# Patient Record
Sex: FEMALE | Race: BLACK OR AFRICAN AMERICAN | NOT HISPANIC OR LATINO | Employment: UNEMPLOYED | URBAN - METROPOLITAN AREA
[De-identification: names, ages, dates, MRNs, and addresses within clinical notes are randomized per-mention and may not be internally consistent; named-entity substitution may affect disease eponyms.]

---

## 2023-04-04 LAB
ERYTHROCYTE DISTRIBUTION WIDTH (RATIO) BY AUTOMATED COUNT: 14.2 % (ref 11.5–14.5)
ERYTHROCYTE MEAN CORPUSCULAR HEMOGLOBIN CONCENTRATION (G/DL) BY AUTOMATED: 32.2 G/DL (ref 32–36)
ERYTHROCYTE MEAN CORPUSCULAR VOLUME (FL) BY AUTOMATED COUNT: 80 FL (ref 80–100)
ERYTHROCYTES (10*6/UL) IN BLOOD BY AUTOMATED COUNT: 3.65 X10E12/L (ref 4–5.2)
FERRITIN, PREGNANCY: 16 UG/L
FOLATE, SERUM, PREGNANCY: 20 NG/ML
GLUCOSE, 1 HR SCREEN, PREG: 140 MG/DL
HEMATOCRIT (%) IN BLOOD BY AUTOMATED COUNT: 29.2 % (ref 36–46)
HEMOGLOBIN (G/DL) IN BLOOD: 9.4 G/DL (ref 12–16)
IRON (UG/DL) IN SER/PLAS IN PREGNANCY: 55 UG/DL
IRON BINDING CAPACITY (UG/DL) IN PREGNANCY: >505 UG/DL
IRON SATURATION (%) IN PREGNANCY: ABNORMAL %
LEUKOCYTES (10*3/UL) IN BLOOD BY AUTOMATED COUNT: 5.4 X10E9/L (ref 4.4–11.3)
PLATELETS (10*3/UL) IN BLOOD AUTOMATED COUNT: 302 X10E9/L (ref 150–450)
REFLEX ADDED, ANEMIA PANEL: ABNORMAL
VITAMIN B12, PREGNANCY: 310 PG/ML

## 2023-04-05 LAB — SYPHILIS TOTAL AB: NONREACTIVE

## 2023-04-11 LAB
GLUCOSE THREE HOUR: 124 MG/DL
GLUCOSE TWO HOUR: 141 MG/DL
GLUCOSE, FASTING: 76 MG/DL
GLUCOSE, ONE HOUR: 132 MG/DL
GTTCM: NORMAL

## 2023-04-14 ENCOUNTER — HOSPITAL ENCOUNTER (OUTPATIENT)
Dept: DATA CONVERSION | Facility: HOSPITAL | Age: 33
End: 2023-04-14
Attending: OBSTETRICS & GYNECOLOGY
Payer: COMMERCIAL

## 2023-04-14 DIAGNOSIS — O47.03 FALSE LABOR BEFORE 37 COMPLETED WEEKS OF GESTATION, THIRD TRIMESTER (HHS-HCC): ICD-10-CM

## 2023-04-14 DIAGNOSIS — Z3A.27 27 WEEKS GESTATION OF PREGNANCY (HHS-HCC): ICD-10-CM

## 2023-04-14 DIAGNOSIS — O09.12: ICD-10-CM

## 2023-04-14 DIAGNOSIS — M54.50 LOW BACK PAIN, UNSPECIFIED: ICD-10-CM

## 2023-04-14 DIAGNOSIS — Z87.59 PERSONAL HISTORY OF OTHER COMPLICATIONS OF PREGNANCY, CHILDBIRTH AND THE PUERPERIUM: ICD-10-CM

## 2023-04-14 DIAGNOSIS — O26.892 OTHER SPECIFIED PREGNANCY RELATED CONDITIONS, SECOND TRIMESTER (HHS-HCC): ICD-10-CM

## 2023-04-14 DIAGNOSIS — N89.8 OTHER SPECIFIED NONINFLAMMATORY DISORDERS OF VAGINA: ICD-10-CM

## 2023-04-14 DIAGNOSIS — O99.012 ANEMIA COMPLICATING PREGNANCY, SECOND TRIMESTER (HHS-HCC): ICD-10-CM

## 2023-04-14 DIAGNOSIS — B00.9 HERPESVIRAL INFECTION, UNSPECIFIED: ICD-10-CM

## 2023-04-14 DIAGNOSIS — O40.2XX0: ICD-10-CM

## 2023-04-14 DIAGNOSIS — D56.1 BETA THALASSEMIA (MULTI): ICD-10-CM

## 2023-04-14 DIAGNOSIS — O98.512 OTHER VIRAL DISEASES COMPLICATING PREGNANCY, SECOND TRIMESTER (HHS-HCC): ICD-10-CM

## 2023-04-14 LAB
CLUE CELLS WET PREP: NORMAL
TRICH WET PREP: NORMAL
WBC WET PREP: NORMAL /HPF
YEAST PRESENCE WET PREP: NORMAL

## 2023-05-24 LAB
ALANINE AMINOTRANSFERASE (SGPT) (U/L) IN SER/PLAS: 12 U/L (ref 7–45)
ALBUMIN (G/DL) IN SER/PLAS: 3.6 G/DL (ref 3.4–5)
ALKALINE PHOSPHATASE (U/L) IN SER/PLAS: 84 U/L (ref 33–110)
ANION GAP IN SER/PLAS: 13 MMOL/L (ref 10–20)
ASPARTATE AMINOTRANSFERASE (SGOT) (U/L) IN SER/PLAS: 15 U/L (ref 9–39)
BILIRUBIN TOTAL (MG/DL) IN SER/PLAS: 0.3 MG/DL (ref 0–1.2)
CALCIUM (MG/DL) IN SER/PLAS: 8.8 MG/DL (ref 8.6–10.3)
CARBON DIOXIDE, TOTAL (MMOL/L) IN SER/PLAS: 21 MMOL/L (ref 21–32)
CHLORIDE (MMOL/L) IN SER/PLAS: 104 MMOL/L (ref 98–107)
CREATININE (MG/DL) IN SER/PLAS: 0.56 MG/DL (ref 0.5–1.05)
GFR FEMALE: >90 ML/MIN/1.73M2
GLUCOSE (MG/DL) IN SER/PLAS: 114 MG/DL (ref 74–99)
POTASSIUM (MMOL/L) IN SER/PLAS: 3.8 MMOL/L (ref 3.5–5.3)
PROTEIN TOTAL: 6.6 G/DL (ref 6.4–8.2)
SODIUM (MMOL/L) IN SER/PLAS: 134 MMOL/L (ref 136–145)
UREA NITROGEN (MG/DL) IN SER/PLAS: 8 MG/DL (ref 6–23)

## 2023-06-09 ENCOUNTER — HOSPITAL ENCOUNTER (OUTPATIENT)
Dept: DATA CONVERSION | Facility: HOSPITAL | Age: 33
End: 2023-06-09
Attending: OBSTETRICS & GYNECOLOGY
Payer: COMMERCIAL

## 2023-06-09 DIAGNOSIS — O09.213 SUPERVISION OF PREGNANCY WITH HISTORY OF PRE-TERM LABOR, THIRD TRIMESTER (HHS-HCC): ICD-10-CM

## 2023-06-09 DIAGNOSIS — O26.893 OTHER SPECIFIED PREGNANCY RELATED CONDITIONS, THIRD TRIMESTER (HHS-HCC): ICD-10-CM

## 2023-06-09 DIAGNOSIS — A60.00 HERPESVIRAL INFECTION OF UROGENITAL SYSTEM, UNSPECIFIED: ICD-10-CM

## 2023-06-09 DIAGNOSIS — D56.1 BETA THALASSEMIA (MULTI): ICD-10-CM

## 2023-06-09 DIAGNOSIS — Z79.899 OTHER LONG TERM (CURRENT) DRUG THERAPY: ICD-10-CM

## 2023-06-09 DIAGNOSIS — O98.313: ICD-10-CM

## 2023-06-09 DIAGNOSIS — O99.013 ANEMIA COMPLICATING PREGNANCY, THIRD TRIMESTER (HHS-HCC): ICD-10-CM

## 2023-06-09 DIAGNOSIS — N89.8 OTHER SPECIFIED NONINFLAMMATORY DISORDERS OF VAGINA: ICD-10-CM

## 2023-06-09 DIAGNOSIS — Z3A.35 35 WEEKS GESTATION OF PREGNANCY (HHS-HCC): ICD-10-CM

## 2023-06-09 DIAGNOSIS — O09.13 SUPERVISION OF PREGNANCY WITH HISTORY OF ECTOPIC PREGNANCY, THIRD TRIMESTER (HHS-HCC): ICD-10-CM

## 2023-06-09 LAB
CHLAMYDIA TRACH., AMPLIFIED: NEGATIVE
N. GONORRHEA, AMPLIFIED: NEGATIVE
POCT GLUCOSE: 72 MG/DL (ref 74–99)

## 2023-06-11 ENCOUNTER — HOSPITAL ENCOUNTER (OUTPATIENT)
Dept: DATA CONVERSION | Facility: HOSPITAL | Age: 33
End: 2023-06-11
Attending: OBSTETRICS & GYNECOLOGY
Payer: COMMERCIAL

## 2023-06-11 DIAGNOSIS — Z87.59 PERSONAL HISTORY OF OTHER COMPLICATIONS OF PREGNANCY, CHILDBIRTH AND THE PUERPERIUM: ICD-10-CM

## 2023-06-11 DIAGNOSIS — O99.013 ANEMIA COMPLICATING PREGNANCY, THIRD TRIMESTER (HHS-HCC): ICD-10-CM

## 2023-06-11 DIAGNOSIS — A60.00 HERPESVIRAL INFECTION OF UROGENITAL SYSTEM, UNSPECIFIED: ICD-10-CM

## 2023-06-11 DIAGNOSIS — D56.1 BETA THALASSEMIA (MULTI): ICD-10-CM

## 2023-06-11 DIAGNOSIS — Z34.80 ENCOUNTER FOR SUPERVISION OF OTHER NORMAL PREGNANCY, UNSPECIFIED TRIMESTER (HHS-HCC): ICD-10-CM

## 2023-06-11 DIAGNOSIS — O98.313: ICD-10-CM

## 2023-06-11 DIAGNOSIS — Z3A.35 35 WEEKS GESTATION OF PREGNANCY (HHS-HCC): ICD-10-CM

## 2023-06-11 DIAGNOSIS — O36.5930 MATERNAL CARE FOR OTHER KNOWN OR SUSPECTED POOR FETAL GROWTH, THIRD TRIMESTER, NOT APPLICABLE OR UNSPECIFIED (HHS-HCC): ICD-10-CM

## 2023-06-11 DIAGNOSIS — O09.213 SUPERVISION OF PREGNANCY WITH HISTORY OF PRE-TERM LABOR, THIRD TRIMESTER (HHS-HCC): ICD-10-CM

## 2023-06-11 DIAGNOSIS — O09.13 SUPERVISION OF PREGNANCY WITH HISTORY OF ECTOPIC PREGNANCY, THIRD TRIMESTER (HHS-HCC): ICD-10-CM

## 2023-06-11 DIAGNOSIS — Z79.899 OTHER LONG TERM (CURRENT) DRUG THERAPY: ICD-10-CM

## 2023-06-11 DIAGNOSIS — Z29.89 ENCOUNTER FOR OTHER SPECIFIED PROPHYLACTIC MEASURES: ICD-10-CM

## 2023-06-11 LAB — GROUP B STREP SCREEN: ABNORMAL

## 2023-06-12 ENCOUNTER — DOCUMENTATION (OUTPATIENT)
Dept: CARE COORDINATION | Facility: CLINIC | Age: 33
End: 2023-06-12
Payer: COMMERCIAL

## 2023-06-14 ENCOUNTER — PATIENT OUTREACH (OUTPATIENT)
Dept: CARE COORDINATION | Facility: CLINIC | Age: 33
End: 2023-06-14
Payer: COMMERCIAL

## 2023-06-27 ENCOUNTER — DOCUMENTATION (OUTPATIENT)
Dept: CARE COORDINATION | Facility: CLINIC | Age: 33
End: 2023-06-27
Payer: COMMERCIAL

## 2023-09-07 VITALS — HEIGHT: 67 IN | WEIGHT: 167.11 LBS | BODY MASS INDEX: 26.23 KG/M2

## 2023-09-07 VITALS — HEIGHT: 67 IN | BODY MASS INDEX: 26.26 KG/M2 | HEIGHT: 67 IN | WEIGHT: 167.33 LBS | BODY MASS INDEX: 26.34 KG/M2

## 2023-09-14 NOTE — PROGRESS NOTES
Current Stage:   Stage: Triage     Subjective Data:   Antepartum:  Vaginal Bleeding: No   Contractions/Abdominal Pain: Yes   Discharge/Loss of Fluid: Yes   Fetal Movement: Good   Fevers/Chills: No   Preeclampsia Symptoms: No   Antepartum:    32yo  @ 27.5 wga by LMP c/w 12.2 US presents for c/f leaking fluid and lower back pain. She reports at 1030AM she noticed her shorts felt damp. She was out running errands  and continued to notice that feeling. She reports no change in discharge. She reports feeling increased jessica-jacobs ctx over the last 2 days. She endorses good FM and no VB.     Pregnancy c/b:  - polyhydraminos, JAVIER 25  - abnl 1 hr, nl 3 hr  - h/o PEC in prior pregnancy  - Beta thalassemia, last Hgb 9.9  - HSV  - h/o endometritis in prior pregnancy and concern for septic pelvic thrombophlebitis    ObHx:  ectopic w/ salpingectomy after failed MTX    @39w, c/b PEC   2019  @37 wga SROM    GynHx:   PMHx: Beta Thalassemia, anemia, migraines with aura  PSHx: R salpingectomy, tonsillectomy,   FHx: DM, HTN  SHx: denies, lives with partner and son  Meds: PNV,  tylenol, zyrtec, flonase, Iron  Allergies: Bactrim, Levaquin, reglan, sulfa drugs            Objective Information:    Objective Information:      T   P  R  BP   MAP  SpO2   Value  36.4  93  17  131/77      98%  Date/Time  12:36  13:59  12:36  12:36     13:59  Range  (36.4C - 36.4C )  (91 - 107 )  (17 - 17 )  (131 - 131 )/ (77 - 77 )    (96% - 100% )      Pain reported at  12:36: 0 = None      Physical Exam:   Constitutional: alert, oriented   Obstetric: FHT: 135, mod variability, +accels, -decels   TOCO: irreg ctx w/ uterine irritability   SSE: neg for pooling, nitrazine, ferning, thin white discharge in vaginal vault    SVE: 0/0/-3, unchanged on 2 hr recheck   Respiratory/Thorax: normal respiratory effort, on  room air   Gastrointestinal: +BS, +flatus   Musculoskeletal: CORRALES   Extremities: no  erythema, edema, or tenderness to  palpation of b/l calves   Neurological: no deficits   Psychological: appropriate affect   Skin: no rashes or lesions      Testing:   NST Interpretation - Baby A:  ·  Baseline    ·  Variability moderate (amplitude range 6 to 25 bpm)   ·  Interpretation Appropriate for EGA (2 10x10 accels)   ·  Accelerations +   ·  Decelerations -     Assessment and Plan:   Assessment:    30yo  @ 27.4 wga by LMP c/w 12.2 US presents for c/f leaking fluid and lower back pain.     False Labor  - R/o SROM- neg x3 for pooling, nitrazine, ferning    - Cervix: 0/0/-3, unchanged on 2 hr recheck  - Some irregular ctx and uterine irritability noted on toco  - Wet prep pending, will call w/ abnl results  - Uterine irritability likely d/t dehydration vs BV  - Patient is not in PTL, reviewed return precautions and s/sx of labor   - Recommended tylenol, warm showers, hot packs for discomfort    IUP at 27.4  - NST AGA  - Good fetal movement  - BSUS w/ Dr. Randall- JAVIER 22, MVP 8.3  - Continue routine prenatal care, next appt: next wed   - Precautions to return discussed     Maternal Well-being  - Vital signs stable and WNL  - Emotional support and reassurance provided  - All questions and concerns addressed      D/w Dr. Maza.  DEDRICK Bryant           Plan of Care Reviewed With:  Plan of Care Reviewed With: patient     Attestation:   Note Completion:  I am a:  Advanced Practice Provider   Attending Only - Shared Visit with Advanced Practice Provider This is a shared visit.  I have reviewed the Advanced Practice Provider?s encounter note, approve the Advanced Practice Provider?s documentation,  and provide the following additional information from my personal encounter.    Comments/ Additional Findings    27 weeks  No evidence of rupture on exam, poly on JAVIER.  Return precautions discussed  Can Maza MD          Electronic Signatures:  Can Maza)  (Signed 2023  07:42)   Authored: Note Completion   Co-Signer: Current Stage, Subjective Data, Objective Data,  Testing, Assessment and Plan, Note Completion  Jazmine Castillo (PAC)  (Signed 2023 18:43)   Authored: Current Stage, Subjective Data, Objective Data,   Testing, Assessment and Plan, Note Completion      Last Updated: 2023 07:42 by Can Maza)

## 2023-09-30 NOTE — PROGRESS NOTES
Current Stage:   Stage: Triage     Subjective Data:   Antepartum:  Antepartum:      32yo  @ 35.6 wga by LMP c/w 12.2 US presents for 2nd dose of BMZ after recent admission for PTL.     She was admitted yesterday for ctx, found to be 4-5cm and was admitted for obs. SVE on d/c was 4.5/70-2. She states she is continuing to have mild ctx q5min. Denies LOF, VB, or dec FM.    Pregnancy c/b:  - h/o  delivery at 35.6 c/b PEC  - Intermittent polyhydraminos, JAVIER 27 on   - FGR, last US , EFW 5% (1916 g), AC <1%  - abnl 1 hr, nl 3 hr  - Beta thalassemia, last Hgb 10.3 (), scheduled for iron infusion   - genital HSV- taking ppx valacyclovir  - h/o endometritis  and concern for septic pelvic thrombophlebitis  - One mild range BP on  at office    ObHx:   2007 ectopic w/ R salpingectomy after failed MTX    @35.6w, c/b PEC   2019  @37 wga SROM  2 TABs    GynHx:  co-testing NIL, negative  PMHx: Beta Thalassemia, anemia, migraines with aura  PSHx: R salpingectomy, tonsillectomy  FHx: DM, HTN  SHx: denies t/e/d  Meds: PNV,  vitamin D, Valacyclovir daily  Allergies: Bactrim, Levaquin, reglan, sulfa drugs          Objective Information:    Objective Information:      T   P  R  BP   MAP  SpO2   Value  36.7  100  17  120/75   91  97%  Date/Time  21:43  21:43  21:43  21:43   21:43  21:43  Range  (36.5C - 36.7C )  (94 - 118 )  (17 - 18 )  (120 - 129 )/ (75 - 81 )  (91 - 99 )  (89% - 100% )      Pain reported at  21:30: 0 = None      Physical Exam:   Constitutional: Alert, well-appearing   Obstetric: SVE: 4.5/70/-2, unchanged from prior  FHT: baseline 130s, mod variability, + accels, - decels  TOCO: occasional rare ctx   Eyes: EOMI   Respiratory/Thorax: breathing comfortably on room  air   Cardiovascular: WWP   Musculoskeletal: ROM nl   Neurological: No focal deficits   Psychological: Appropriate affect      Testing:   NST Interpretation -  Baby A:  ·  Baseline    ·  Variability moderate (amplitude range 6 to 25 bpm)   ·  Interpretation Reactive (2 15x15 accels)   ·  Accelerations +   ·  Decelerations -     Assessment and Plan:   Assessment:    32yo  @ 35.6 wga by LMP c/w 12.2 US presents for 2nd dose of BMZ after recent admission for PTL, continuing to have ctx.    r/o PTL  - Patient continuing to have subjective ctx however rare ctx visualized on toco and patient very comfortable.   - cervix stable at 4.5cm over multiple checks, low concern for progressing PTL.  - offered patient 2hr recheck to confirm stability, however patient declines.   - 2nd dose of BMZ given  - Patient discharged with labor precautions. Instructed to return to care if contractions occur <5 minutes apart, new onset of vaginal bleeding, loss of fluid, decreased fetal movement  or any other symptoms.    Fetal wellbeing  - NST reactive  - BMZ complete 6/10-    Dispo: home with follow up scheduled  with Dr. Hills    D/w Dr. Elisa Chau MD, PGY-1        Attestation:   Note Completion:  I am a:  Resident/Fellow   Attending Attestation I saw and evaluated the patient.  I personally obtained the key and critical portions of the history and physical exam or was physically present for key and  critical portions performed by the resident/fellow. I reviewed the resident/fellow?s documentation and discussed the patient with the resident/fellow.  I agree with the resident/fellow?s medical decision making as documented in the note.     I personally evaluated the patient on 2023         Electronic Signatures:  Camelia Chau (Resident))  (Signed 2023 00:14)   Authored: Current Stage, Subjective Data, Objective Data,   Testing, Assessment and Plan, Note Completion  Casandra Pérez)  (Signed 2023 21:35)   Authored: Note Completion   Co-Signer: Current Stage, Subjective Data, Objective Data,  Testing, Assessment and  Plan, Note Completion      Last Updated: 25-Jun-2023 21:35 by Casandra Pérez)

## 2023-09-30 NOTE — PROGRESS NOTES
Current Stage:   Stage: Triage     Subjective Data:   Antepartum:  Antepartum:    32yo  @ 35.4 wga by LMP c/w 12.2 US presents for contractions. Contractions started yesterday morning prior to prental appt. Increase in intensity ~2300. Patient notes  brown mucous this morning, denies bleeding and LOF, endorses good FM. Patient reports decrease in intensity and frequency of ctx this afternoon, now irregular and rated 6-8/10 in intensity. Denies vaginal signs and symptoms of infection.    Pregnancy c/b:  - h/o  delivery at 35.6 c/b PEC  - Intermittent polyhydraminos, JAVIER 27 on   - FGR, last US , EFW 5% (1916 g), AC <1%  - abnl 1 hr, nl 3 hr  - Beta thalassemia, last Hgb 10.3 (), scheduled for iron infusion   - genital HSV- taking ppx valacyclovir  - h/o endometritis  and concern for septic pelvic thrombophlebitis    ObHx:    ectopic w/ R salpingectomy after failed MTX    @35.6w, c/b PEC   2019  @37 wga SROM  2 TABs    GynHx:  co-testing NIL, negative  PMHx: Beta Thalassemia, anemia, migraines with aura  PSHx: R salpingectomy, tonsillectomy  FHx: DM, HTN  SHx: denies t/e/d  Meds: PNV,  vitamin D, Valacyclovir  Allergies: Bactrim, Levaquin, reglan, sulfa drugs      Objective Information:    Objective Information:      T   P  R  BP   MAP  SpO2   Value     85     121/74   93     Date/Time    16:17    16:17   16:17    Range     (83 - 98 )    (117 - 130 )/ (74 - 87 )  (91 - 104 )        Physical Exam:   Constitutional: well-appearing   Obstetric: FHT: 135, mod variability, +accels, -decels   [place]: irregular ctx, 5-10 min  SVE: 3/50/-2   Respiratory/Thorax: normal respiratory effort   Cardiovascular: Warm, well-perfused   Gastrointestinal: Passing flatus, tolerating PO diet   Genitourinary: Voiding without difficulty   Musculoskeletal: CORRALES   Extremities: No tenderness to palpation, asymmetrical  swelling, or erythema of bilateral calves    Neurological: A&Ox3   Psychological: affect appropriate to clinical situation   Skin: Grossly intact      Testing:   NST Interpretation - Baby A:  ·  Baseline    ·  Variability moderate (amplitude range 6 to 25 bpm)   ·  Interpretation Reactive (2 15x15 accels)   ·  Accelerations +   ·  Decelerations -     Assessment and Plan:   Comorbidity:  ·  Comorbidty anemia   ·  Anemia iron deficiency anemia     Assessment:    30yo  @ 35.4 wga by LMP c/w 12.2 US presents for contractions.      contractions  - SVE 3/50/-2, unchanged from check at prenatal visit yesterday  - Contractions irregular, 5-10 min while in triage  - 3+ ketones on urine chemistry, POC 72, checked d/t polyhydramnios, PO hydration and food  - Flexeril and tylenol decreased intensity and frequency of contractions and back pain  - Patient declines rx for flexeril for home  - Recommend increasing PO fluids and small frequent snacks    IUP at 35.4 wga  - NST reactive  - Good fetal movement  - Continue routine prenatal care  - Next appt 6/15    Maternal Well-being  - Vital signs stable and WNL  - All questions and concerns addressed     Dispo  - Patient comfortable with d/c home  - Return precautions discussed    Plan and tracing discussed with Dr. Maza who reviewed tracing and agrees with d/c home  Gabriella LIU-CNP       Attestation:   Note Completion:  I am a:  Advanced Practice Provider   Attending Only - Shared Visit with Advanced Practice Provider This is a shared visit.  I have reviewed the Advanced Practice Provider?s encounter note, approve the Advanced Practice Provider?s documentation,  and provide the following additional information from my personal encounter.    Comments/ Additional Findings    Saw pt and agree.  Can Maza MD          Electronic Signatures:  Gabriella Candelario (APRN-CNP)  (Signed 2023 17:12)   Authored: Current Stage, Subjective Data, Objective Data,   Testing, Assessment  and Plan, Note Completion  Can Maza)  (Signed 2023 11:57)   Authored: Note Completion   Co-Signer: Current Stage, Subjective Data, Objective Data,  Testing, Assessment and Plan, Note Completion      Last Updated: 2023 11:57 by Can Maza)

## 2023-11-01 ENCOUNTER — TELEPHONE (OUTPATIENT)
Dept: OBSTETRICS AND GYNECOLOGY | Facility: CLINIC | Age: 33
End: 2023-11-01
Payer: COMMERCIAL

## 2023-11-01 DIAGNOSIS — Z00.00 HEALTHCARE MAINTENANCE: Primary | ICD-10-CM

## 2023-11-01 NOTE — TELEPHONE ENCOUNTER
Patient would like to know if you can call her in a RX for Cleosin to her pharmacy [facility] on [address] in [facility].  She states that switching soaps caused the issue. Patient also states that she has used this medication in the past and it works well for her.

## 2023-11-02 RX ORDER — CLINDAMYCIN PHOSPHATE 100 MG/1
100 SUPPOSITORY VAGINAL NIGHTLY
Qty: 3 SUPPOSITORY | Refills: 0 | Status: SHIPPED | OUTPATIENT
Start: 2023-11-02 | End: 2023-11-05

## 2024-03-01 ENCOUNTER — TELEPHONE (OUTPATIENT)
Dept: OBSTETRICS AND GYNECOLOGY | Facility: CLINIC | Age: 34
End: 2024-03-01
Payer: COMMERCIAL

## 2024-03-01 DIAGNOSIS — Z00.00 HEALTHCARE MAINTENANCE: Primary | ICD-10-CM

## 2024-03-01 RX ORDER — CLINDAMYCIN PHOSPHATE 100 MG/1
100 SUPPOSITORY VAGINAL NIGHTLY
Qty: 3 SUPPOSITORY | Refills: 0 | Status: SHIPPED | OUTPATIENT
Start: 2024-03-01 | End: 2024-03-04

## 2024-03-01 NOTE — TELEPHONE ENCOUNTER
MARLO VICKERS:    THIS PATIENT IS REQUESTING A REFILL FOR BV, SHE STATED SHE USED A BODY WASH, AND IS STILL HAVING SYMPTOMS. SHE WOULD LIKE THE CLEOCIN SUPPOSITORIES PLEASE.    THANK YOU

## 2024-03-01 NOTE — TELEPHONE ENCOUNTER
RN called and verified Patient  Used a feminine wash back in November that caused BV.  Patient didn't remember she had issues with this specific wash and used it again, having the same side effects as in November, discharge and odor.  Patient states she looked at the reviews on online and there are numerous reports of women who used this scent and had the same side effect.  Requesting medication  Torrie Ortega

## 2024-04-17 ENCOUNTER — APPOINTMENT (OUTPATIENT)
Dept: PRIMARY CARE | Facility: CLINIC | Age: 34
End: 2024-04-17
Payer: COMMERCIAL

## 2024-06-13 ENCOUNTER — APPOINTMENT (OUTPATIENT)
Dept: PRIMARY CARE | Facility: CLINIC | Age: 34
End: 2024-06-13
Payer: COMMERCIAL

## 2024-07-31 ENCOUNTER — TELEMEDICINE (OUTPATIENT)
Dept: PRIMARY CARE | Facility: CLINIC | Age: 34
End: 2024-07-31
Payer: COMMERCIAL

## 2024-07-31 DIAGNOSIS — J01.30 ACUTE NON-RECURRENT SPHENOIDAL SINUSITIS: Primary | ICD-10-CM

## 2024-07-31 PROCEDURE — 99213 OFFICE O/P EST LOW 20 MIN: CPT | Performed by: FAMILY MEDICINE

## 2024-07-31 ASSESSMENT — LIFESTYLE VARIABLES: HISTORY_OF_SMOKING: I HAVE NEVER SMOKED

## 2024-07-31 NOTE — PROGRESS NOTES
Subjective   Gia Dan is a 34 y.o. female who presents for evaluation of possible sinus infection. Symptoms include facial pressure, nasal blockage,extreme tiredness  with no fever, chills, night sweats or weight loss. Onset of symptoms was 5 days ago, unchanged since that time. She is drinking moderate amounts of fluids. Past history is significant for no history of pneumonia or bronchitis.   Treatment : Claritin D    Dd not test for COVID 19 or Flu     Objective   There were no vitals taken for this visit.  Physical Exam  Neurological:      Mental Status: She is alert.     Appears tired,   No acute distress  A&Ox3  Normal breathing pattern     Assessment/Plan   Acute viral sinusitis.    1. Theraflu  2.   3. Nasal saline rinses as needed for congestion.  4. Follow-up with PCP in 5 days if symptoms worsen or persist.

## 2024-10-01 ENCOUNTER — APPOINTMENT (OUTPATIENT)
Dept: PRIMARY CARE | Facility: CLINIC | Age: 34
End: 2024-10-01
Payer: COMMERCIAL

## 2024-10-01 VITALS
WEIGHT: 154 LBS | DIASTOLIC BLOOD PRESSURE: 82 MMHG | SYSTOLIC BLOOD PRESSURE: 117 MMHG | BODY MASS INDEX: 24.17 KG/M2 | HEIGHT: 67 IN | OXYGEN SATURATION: 98 % | HEART RATE: 81 BPM

## 2024-10-01 DIAGNOSIS — G43.109 MIGRAINE WITH AURA AND WITHOUT STATUS MIGRAINOSUS, NOT INTRACTABLE: ICD-10-CM

## 2024-10-01 DIAGNOSIS — S16.1XXS STRAIN OF NECK MUSCLE, SEQUELA: Primary | ICD-10-CM

## 2024-10-01 DIAGNOSIS — D56.1 BETA THALASSEMIA (MULTI): ICD-10-CM

## 2024-10-01 DIAGNOSIS — M26.609 TMJ DYSFUNCTION: ICD-10-CM

## 2024-10-01 DIAGNOSIS — Z00.00 WELLNESS EXAMINATION: ICD-10-CM

## 2024-10-01 DIAGNOSIS — E55.9 VITAMIN D DEFICIENCY: ICD-10-CM

## 2024-10-01 PROBLEM — M99.04 SOMATIC DYSFUNCTION OF SACRAL REGION: Status: ACTIVE | Noted: 2024-10-01

## 2024-10-01 PROBLEM — R51.9 HEADACHE: Status: ACTIVE | Noted: 2024-10-01

## 2024-10-01 PROBLEM — R11.0 NAUSEA: Status: ACTIVE | Noted: 2024-10-01

## 2024-10-01 PROBLEM — R20.8 DYSESTHESIA: Status: ACTIVE | Noted: 2024-10-01

## 2024-10-01 PROBLEM — J20.9 ACUTE BRONCHITIS: Status: ACTIVE | Noted: 2024-10-01

## 2024-10-01 PROBLEM — R44.8 FACIAL PRESSURE: Status: ACTIVE | Noted: 2024-10-01

## 2024-10-01 PROBLEM — K62.89 RECTAL PAIN: Status: ACTIVE | Noted: 2024-10-01

## 2024-10-01 PROBLEM — N60.12 FIBROCYSTIC BREAST CHANGES, BILATERAL: Status: ACTIVE | Noted: 2019-08-01

## 2024-10-01 PROBLEM — N92.6 MISSED MENSES: Status: ACTIVE | Noted: 2024-10-01

## 2024-10-01 PROBLEM — R23.3 BRUISES EASILY: Status: ACTIVE | Noted: 2024-10-01

## 2024-10-01 PROBLEM — N76.2 ACUTE VULVITIS: Status: ACTIVE | Noted: 2024-10-01

## 2024-10-01 PROBLEM — R68.84 JAW PAIN: Status: ACTIVE | Noted: 2024-10-01

## 2024-10-01 PROBLEM — G89.29 CHRONIC PAIN: Status: ACTIVE | Noted: 2024-10-01

## 2024-10-01 PROBLEM — B97.7 HUMAN PAPILLOMA VIRUS (HPV) INFECTION: Status: ACTIVE | Noted: 2024-10-01

## 2024-10-01 PROBLEM — O26.90 DISEASE OF PREGNANCY (HHS-HCC): Status: ACTIVE | Noted: 2024-10-01

## 2024-10-01 PROBLEM — D64.9 ANEMIA: Status: ACTIVE | Noted: 2024-10-01

## 2024-10-01 PROBLEM — R03.0 ELEVATED BLOOD PRESSURE READING: Status: ACTIVE | Noted: 2019-08-01

## 2024-10-01 PROBLEM — F32.A DEPRESSION: Status: ACTIVE | Noted: 2024-10-01

## 2024-10-01 PROBLEM — R23.3 EASY BRUISING: Status: ACTIVE | Noted: 2024-10-01

## 2024-10-01 PROBLEM — N91.2 AMENORRHEA: Status: ACTIVE | Noted: 2024-10-01

## 2024-10-01 PROBLEM — R16.0 HEPATOMEGALY: Status: ACTIVE | Noted: 2024-10-01

## 2024-10-01 PROBLEM — R05.9 COUGH: Status: ACTIVE | Noted: 2024-10-01

## 2024-10-01 PROBLEM — K59.09 CHRONIC CONSTIPATION: Status: ACTIVE | Noted: 2024-10-01

## 2024-10-01 PROBLEM — R07.9 CHEST PAIN: Status: ACTIVE | Noted: 2024-10-01

## 2024-10-01 PROBLEM — R31.9 HEMATURIA: Status: ACTIVE | Noted: 2024-10-01

## 2024-10-01 PROBLEM — N87.0 CERVICAL DYSPLASIA, MILD: Status: ACTIVE | Noted: 2024-10-01

## 2024-10-01 PROBLEM — L29.9 PRURITUS: Status: ACTIVE | Noted: 2024-10-01

## 2024-10-01 PROBLEM — N92.6 MENSTRUAL ABNORMALITY: Status: ACTIVE | Noted: 2024-10-01

## 2024-10-01 PROBLEM — J32.9 SINUSITIS: Status: ACTIVE | Noted: 2024-10-01

## 2024-10-01 PROBLEM — O42.919 PRETERM PREMATURE RUPTURE OF MEMBRANES (HHS-HCC): Status: ACTIVE | Noted: 2024-10-01

## 2024-10-01 PROBLEM — M99.03 SOMATIC DYSFUNCTION OF LUMBAR REGION: Status: ACTIVE | Noted: 2024-10-01

## 2024-10-01 PROBLEM — R50.9 FEVER: Status: ACTIVE | Noted: 2024-10-01

## 2024-10-01 PROBLEM — G47.00 INSOMNIA: Status: ACTIVE | Noted: 2024-10-01

## 2024-10-01 PROBLEM — M99.02 SOMATIC DYSFUNCTION OF THORACIC REGION: Status: ACTIVE | Noted: 2024-10-01

## 2024-10-01 PROBLEM — I15.9 SECONDARY HYPERTENSION: Status: ACTIVE | Noted: 2024-10-01

## 2024-10-01 PROBLEM — J30.2 SEASONAL ALLERGIES: Status: ACTIVE | Noted: 2024-10-01

## 2024-10-01 PROBLEM — R43.0 NO SENSE OF SMELL: Status: ACTIVE | Noted: 2024-10-01

## 2024-10-01 PROBLEM — G43.909 MIGRAINE HEADACHE: Status: ACTIVE | Noted: 2024-10-01

## 2024-10-01 PROBLEM — J34.2 DEVIATED SEPTUM: Status: ACTIVE | Noted: 2024-10-01

## 2024-10-01 PROBLEM — J34.3 HYPERTROPHY OF BOTH INFERIOR NASAL TURBINATES: Status: ACTIVE | Noted: 2024-10-01

## 2024-10-01 PROBLEM — R10.13 ABDOMINAL PAIN, ACUTE, EPIGASTRIC: Status: ACTIVE | Noted: 2024-10-01

## 2024-10-01 PROBLEM — R51.9 FREQUENT HEADACHES: Status: ACTIVE | Noted: 2024-10-01

## 2024-10-01 PROBLEM — G43.019 COMMON MIGRAINE WITH INTRACTABLE MIGRAINE: Status: ACTIVE | Noted: 2017-03-29

## 2024-10-01 PROBLEM — J30.9 ALLERGIC RHINITIS: Status: ACTIVE | Noted: 2024-10-01

## 2024-10-01 PROBLEM — H53.8 BLURRED VISION: Status: ACTIVE | Noted: 2024-10-01

## 2024-10-01 PROBLEM — R00.2 PALPITATIONS: Status: ACTIVE | Noted: 2024-10-01

## 2024-10-01 PROBLEM — M99.01 SOMATIC DYSFUNCTION OF CERVICAL REGION: Status: ACTIVE | Noted: 2024-10-01

## 2024-10-01 PROBLEM — K92.1 HEMATOCHEZIA: Status: ACTIVE | Noted: 2024-10-01

## 2024-10-01 PROBLEM — Z86.69 HISTORY OF MIGRAINE: Status: ACTIVE | Noted: 2024-10-01

## 2024-10-01 PROBLEM — R10.9 ABDOMINAL CRAMPING: Status: ACTIVE | Noted: 2024-10-01

## 2024-10-01 PROBLEM — M54.9 UPPER BACK PAIN ON RIGHT SIDE: Status: ACTIVE | Noted: 2024-10-01

## 2024-10-01 PROBLEM — N60.11 FIBROCYSTIC BREAST CHANGES, BILATERAL: Status: ACTIVE | Noted: 2019-08-01

## 2024-10-01 PROBLEM — R09.81 NASAL CONGESTION: Status: ACTIVE | Noted: 2024-10-01

## 2024-10-01 PROBLEM — H53.8 BLURRING OF VISUAL IMAGE: Status: ACTIVE | Noted: 2024-10-01

## 2024-10-01 PROBLEM — F41.0 PANIC ATTACK: Status: ACTIVE | Noted: 2024-10-01

## 2024-10-01 PROBLEM — Z86.79 HISTORY OF HYPERTENSION: Status: ACTIVE | Noted: 2024-10-01

## 2024-10-01 PROBLEM — R52: Status: ACTIVE | Noted: 2024-10-01

## 2024-10-01 PROBLEM — N90.89 LESION OF VULVA: Status: ACTIVE | Noted: 2024-10-01

## 2024-10-01 PROBLEM — G43.119 CLASSICAL MIGRAINE WITH INTRACTABLE MIGRAINE: Status: ACTIVE | Noted: 2024-10-01

## 2024-10-01 PROBLEM — R43.0 LOSS OF SENSE OF SMELL: Status: ACTIVE | Noted: 2024-10-01

## 2024-10-01 PROBLEM — N85.2 BULKY OR ENLARGED UTERUS: Status: ACTIVE | Noted: 2024-10-01

## 2024-10-01 PROBLEM — R09.82 POSTNASAL DRIP: Status: ACTIVE | Noted: 2024-10-01

## 2024-10-01 PROBLEM — M54.2 NECK PAIN: Status: ACTIVE | Noted: 2021-02-12

## 2024-10-01 PROBLEM — F32.A DEPRESSIVE DISORDER: Status: ACTIVE | Noted: 2024-10-01

## 2024-10-01 PROBLEM — F41.9 ANXIETY: Status: ACTIVE | Noted: 2024-10-01

## 2024-10-01 PROBLEM — R09.81 CONGESTION OF PARANASAL SINUS: Status: ACTIVE | Noted: 2024-10-01

## 2024-10-01 PROBLEM — F43.21 GRIEF: Status: ACTIVE | Noted: 2024-10-01

## 2024-10-01 PROCEDURE — 1036F TOBACCO NON-USER: CPT | Performed by: STUDENT IN AN ORGANIZED HEALTH CARE EDUCATION/TRAINING PROGRAM

## 2024-10-01 PROCEDURE — 3008F BODY MASS INDEX DOCD: CPT | Performed by: STUDENT IN AN ORGANIZED HEALTH CARE EDUCATION/TRAINING PROGRAM

## 2024-10-01 PROCEDURE — 99395 PREV VISIT EST AGE 18-39: CPT | Performed by: STUDENT IN AN ORGANIZED HEALTH CARE EDUCATION/TRAINING PROGRAM

## 2024-10-01 PROCEDURE — 3074F SYST BP LT 130 MM HG: CPT | Performed by: STUDENT IN AN ORGANIZED HEALTH CARE EDUCATION/TRAINING PROGRAM

## 2024-10-01 PROCEDURE — 3079F DIAST BP 80-89 MM HG: CPT | Performed by: STUDENT IN AN ORGANIZED HEALTH CARE EDUCATION/TRAINING PROGRAM

## 2024-10-01 PROCEDURE — 99214 OFFICE O/P EST MOD 30 MIN: CPT | Performed by: STUDENT IN AN ORGANIZED HEALTH CARE EDUCATION/TRAINING PROGRAM

## 2024-10-01 RX ORDER — ACETAMINOPHEN 500 MG
1000 TABLET ORAL
COMMUNITY
End: 2024-10-01 | Stop reason: ALTCHOICE

## 2024-10-01 RX ORDER — TIZANIDINE 2 MG/1
2 TABLET ORAL EVERY 6 HOURS PRN
Qty: 30 TABLET | Refills: 0 | Status: SHIPPED | OUTPATIENT
Start: 2024-10-01 | End: 2024-10-11

## 2024-10-01 RX ORDER — ACETAMINOPHEN 325 MG/1
TABLET ORAL EVERY 6 HOURS
COMMUNITY
Start: 2021-06-21 | End: 2024-10-01 | Stop reason: ALTCHOICE

## 2024-10-01 NOTE — PROGRESS NOTES
HPI: 34-year-old female presenting to [facility] care, follow-up on multiple concerns, CPE.    Chronic constipation  Has been going on long-term, takes MiraLAX as needed, but the problem persists after each time.  Never taking supplemental fiber.    Migraines  No response to triptans, was doing Botox in the past with neurology which was somewhat beneficial, but does not want to do that anymore due to potential side effects.  Has never tried CGRP inhibitors    TMJ dysfunction  Long-term, chronic.  Think she saw ENT in the past.    Neck strain  Chronic, flares frequently.  Did not do well with Flexeril.  Did not do well with physical therapy.    Beta thalassemia  Stable    SocHx:   - Smoking: Never    Denies changes in vision, chest pain, SOB/BROWN, palpitations, N/V/D/C, dysuria/hematuria, hematochezia/melena, paresthesias, LE edema.    12 point ROS reviewed and negative other than as stated in HPI      General: Alert, oriented, pleasant, in no acute distress  HEENT:      Head: normocephalic, atraumatic;      eyes: EOMI, no scleral icterus;   Neck: soft, supple, non-tender, no masses appreciated  CV: Heart with regular rate and rhythm, normal S1/S2, no murmurs  Lungs: CTAB without wheezing, rhonchi or rales; good respiratory effort, no increased work of breathing  Abdomen: soft, non-tender, non-distended, no masses appreciated  Extremities: no edema, no cyanosis  MSK: Minor tenderness along the right side cervical paraspinal musculature, mild hypertonicity  Neuro: Cranial nerves grossly intact; alert and oriented, normal gait  Psych: Appropriate mood and affect    # HM  -CBC, CMP, Lipid panel, Vit D, TSH with reflex T4  -Vaccines:       Flu: declined      Shingrix: At 50      Pneumococcal: At 65      Tdap: Recommended, declined  -Seth w/ vero: At 40  -Last PAP: Follow-up with GYN  -Lung cancer screening with low-dose CT: Never smoker  -Colonoscopy: At 45  -Osteoporosis screening: At 65    #Chronic constipation  -Trial  daily Metamucil or psyllium husk, MiraLAX as needed    #Migraines  -Given samples of Ubrelvy and Nurtec to trial    #TMJ dysfunction  -Discussed 6-week protocol including chewing on other side of the mouth, avoiding gum or chewy foods, mouthguard at night    #Neck pain  -Can trial tizanidine 2 mg as needed, hypotensive precautions given    #Beta thalassemia   -Stable, repeat CBC    F/U 3-4 months, sooner if indicated    Chris D'Amico, DO

## 2024-11-07 ENCOUNTER — APPOINTMENT (OUTPATIENT)
Dept: OBSTETRICS AND GYNECOLOGY | Facility: CLINIC | Age: 34
End: 2024-11-07
Payer: COMMERCIAL

## 2024-11-21 ENCOUNTER — APPOINTMENT (OUTPATIENT)
Dept: OBSTETRICS AND GYNECOLOGY | Facility: CLINIC | Age: 34
End: 2024-11-21
Payer: COMMERCIAL

## 2024-12-12 ENCOUNTER — APPOINTMENT (OUTPATIENT)
Dept: OBSTETRICS AND GYNECOLOGY | Facility: CLINIC | Age: 34
End: 2024-12-12
Payer: COMMERCIAL

## 2025-01-07 ENCOUNTER — APPOINTMENT (OUTPATIENT)
Dept: PRIMARY CARE | Facility: CLINIC | Age: 35
End: 2025-01-07
Payer: COMMERCIAL

## 2025-02-27 ENCOUNTER — APPOINTMENT (OUTPATIENT)
Dept: GASTROENTEROLOGY | Facility: HOSPITAL | Age: 35
End: 2025-02-27
Payer: COMMERCIAL

## 2025-04-28 ENCOUNTER — APPOINTMENT (OUTPATIENT)
Dept: PRIMARY CARE | Facility: CLINIC | Age: 35
End: 2025-04-28
Payer: COMMERCIAL

## 2025-05-01 ENCOUNTER — OFFICE VISIT (OUTPATIENT)
Dept: GASTROENTEROLOGY | Facility: HOSPITAL | Age: 35
End: 2025-05-01
Payer: COMMERCIAL

## 2025-05-01 VITALS
TEMPERATURE: 97.7 F | DIASTOLIC BLOOD PRESSURE: 88 MMHG | BODY MASS INDEX: 23.18 KG/M2 | HEART RATE: 87 BPM | SYSTOLIC BLOOD PRESSURE: 132 MMHG | WEIGHT: 148 LBS | OXYGEN SATURATION: 98 %

## 2025-05-01 DIAGNOSIS — K59.01 SLOW TRANSIT CONSTIPATION: Primary | ICD-10-CM

## 2025-05-01 DIAGNOSIS — K92.1 HEMATOCHEZIA: ICD-10-CM

## 2025-05-01 RX ORDER — SODIUM, POTASSIUM,MAG SULFATES 17.5-3.13G
2 SOLUTION, RECONSTITUTED, ORAL ORAL DAILY
Qty: 2 EACH | Refills: 0 | Status: SHIPPED | OUTPATIENT
Start: 2025-05-01 | End: 2025-05-01

## 2025-05-01 RX ORDER — POLYETHYLENE GLYCOL 3350 17 G/17G
17 POWDER, FOR SOLUTION ORAL 2 TIMES DAILY
Qty: 60 PACKET | Refills: 2 | Status: SHIPPED | OUTPATIENT
Start: 2025-05-01 | End: 2025-07-30

## 2025-05-01 RX ORDER — POLYETHYLENE GLYCOL 3350, SODIUM SULFATE ANHYDROUS, SODIUM BICARBONATE, SODIUM CHLORIDE, POTASSIUM CHLORIDE 236; 22.74; 6.74; 5.86; 2.97 G/4L; G/4L; G/4L; G/4L; G/4L
8 POWDER, FOR SOLUTION ORAL ONCE
Qty: 8000 ML | Refills: 0 | Status: SHIPPED | OUTPATIENT
Start: 2025-05-01 | End: 2025-05-01

## 2025-05-01 ASSESSMENT — ENCOUNTER SYMPTOMS
LOSS OF SENSATION IN FEET: 0
DEPRESSION: 0
OCCASIONAL FEELINGS OF UNSTEADINESS: 0

## 2025-05-01 ASSESSMENT — PAIN SCALES - GENERAL: PAINLEVEL_OUTOF10: 0-NO PAIN

## 2025-05-01 NOTE — PATIENT INSTRUCTIONS
Colonoscopy Instructions     Please read this document carefully as failure to follow the instructions can result in cancellation or delay of your procedure.    Home Medications    If you take medication(s) for diabetes, weight loss, an/or blood thinning, contact the doctor who prescribe these medications to ask whether it is safe to hold the medications and/or adjust their dose for your procedure.      Hold for 7 days prior to procedure  (Consult your primary care physician) Antiplatelets:  Brilinta (ticagrelor), Effient (prasugrel), Plavix (clopidogrel)     Hold for 5 days prior to procedure  (Consult your primary care physician) Coumadin (warfarin)    Multivitamins and iron supplements     Hold 3-4 days prior to procedure (Consult your primary care physician) SGLT2 inhibitors:  Brenzavvy (bexagliflozin), Farxiga (dapagliflozin), Invokana (canagliflozin), Jardiance (empagliflozin), Steglatro (ertugliflozin)     Hold 2 days prior to procedure (Consult your primary care physician) DOACs:  Eliquis (apixaban), Pradaxa (dabigatran), Xarelto (rivaroxaban), Savaysa (edoxaban)     Hold 24 hours prior to procedure (Consult your primary care physician) LMWH  Arixtra (fondaparinux), Fragmin (dalteparin), Lovenox (enoxaparin)     Hold the day of your procedure (Consult your primary care physician) GLP-1/GIP agonists:  Adlyxin (lixisenatide), Bydureon (exenatide ER), Byetta (exenatide IR), Mounjaro (tirzepatide), Ozempic (semaglutide), Rybelsus (semaglutide), Saxenda (liraglutide), Tanzeum (albiglutide),Trulicity (dulaglutide), Victoza (liraglutide), Wegovy (semaglutide)    For other medications for diabetes such as insulin and/or oral hypoglycemics, ask your primary care physician if you need to adjust or hold the dose the day of procedure.        Diet Instructions  5 days before your procedure:    No nuts or seeds such as sesame and poppy seeds  No beans, raw (fresh) fruits, vegetables with seeds, corn, popcorn  No whole  grains such as oatmeal, multigrain, or quinoa.    1 day before your exam, STOP ALL SOLID FOODS. You can only have clear liquids that you can see through including:    Water, clear juice, broth  Gelatin, jello, popsicles, sport drinks (avoid red or purple color drinks)  Black tea or coffee with no cream    Follow the colonoscopy prep instructions to clean out your bowel.    On the day of exam, nothing by mouth for at least 3 hours before your arrival time.      Exam Day    Bring the following:     Photo ID and health insurance card  List of all medications you take and any allergies  Advance directive and/or durable health care power  document if you have them.   CPAP, BIPAP machine, portable oxygen tank if you use them at home.    If you are a female of childbearing age, you may be asked to perform a urine pregnancy test or sign a pregnancy waiver.    For your safety, you WILL NOT be allowed to drive home yourself, use a taxi, bus, or ridesharing service such as Uber.  You must have a designated  to drive you home.    Do not hesitate to contact the [facility] endoscopy center that your procedure is scheduled at or the doctor office performing your procedure for any questions.  List of contact information for the endoscopy centers can be found at the end of this document.      Colonoscopy Bowel Prep - Spit Dose Miralax Prep    This is the most common bowel prep used to clean out your bowel in preparation for your colonoscopy at [facility].  If your doctor recommends other types of bowel prep, see information in later pages for more specific instructions.    5 days before your colonoscopy, purchase at any over-the-counter pharmacy:     1) one 238-gram bottle of Miralax (or generic polyethylene glycol)   2) 4 tablets of Dulcolax (or generic bisacodyl)    1 day before your colonoscopy, mix the 238-gram bottle of Miralax into 64 oz of Gatorade or clear drink of your choice to make the  prep solution.  Make sure to follow the diet instructions.    At 4PM, take 2 tablets of Dulcolax.  At 6PM, begin drinking the first half (32 oz) of your prep solution.  Drink a glass (8 oz) of the prep solution every 15-20 minutes until you complete the 32 oz of solution.  6 hours before your scheduled exam time, take 2 tablets of Dulcolax.  Drink a glass (8 oz) of the prep solution every 15-20 minutes until you complete the second half (remaining 32 oz) of solution.  Nothing by mouth for at least 3 hours before your exam.    Tips:  At the end of drinking all 64 oz of bowel prep solution, your last bowel movement should be clear yellow without solid materials, like urine.    Keeping the prep solution refrigerated or adding flavor such as lemon or orange flavor may help improve the taste and prevent nausea.      Colonoscopy Bowel Prep - Gavilyte, Golytely, Nulytely, Trilyte Prep    Use this instruction only if your doctor recommends to use the Gavilyte, Golytely, Nulytely, or Trilyte Prep for your colonoscopy. Your insurance may or may not cover these medications so contact your pharmacy in advance to ask about cost.     5 days before your colonoscopy, contact your local pharmacy to  the prescribed Golytely, Gavilyte, Nulytely, or Trilyte Prep (4-Liters Bottle).  If your pharmacy does not have this prescription, contact your doctor office to send the prescription over.    1 day before your colonoscopy, make sure to follow the diet instructions.    At 6PM, begin drinking the first half (2 liters) of your prep solution.  Drink a glass (8 oz) of the prep solution every 15-20 minutes until you complete the 2-liters solution.  6 hours before your scheduled exam time, drink a glass (8 oz) of the prep solution every 15-20 minutes until you complete the second half of the solution.  Nothing by mouth for at least 3 hours before your exam.    Tips:  At the end of drinking all 4-liters of bowel prep solution, your last  bowel movement should be clear yellow without solid materials, like urine.  Keeping the prep solution refrigerated or adding flavor such as lemon or orange flavor may help improve the taste and prevent nausea.        Colonoscopy Bowel Prep - Clenpiq, Moviprep, Plenvu, Suprep    Use this instruction only if your doctor recommends to use the Clenpiq, Moviprep, Plenvu, or Suprep Prep for your colonoscopy. Your insurance may or may not cover these medications so contact your pharmacy in advance to ask about cost.     5 days before your colonoscopy, contact your local pharmacy to  the prescribed Clenpiq, Moviprep, Plenvu, or Suprep Prep kit. If your pharmacy does not have this prescription, contact your doctor office to send the prescription over.    1 day before your colonoscopy, make sure to follow the diet instructions.      At 6PM, open the package (kit) and follow the  instructions inside the package on how to mix and drink the prep solution.    6 hours before your scheduled exam time, drink the second part of the prep solution.  Nothing by mouth for at least 3 hours before your exam.        Colonoscopy Bowel Prep - Sutab    Use this instruction only if your doctor recommends to use the Sutab Prep for your colonoscopy. Your insurance may or may not cover these medications so contact your pharmacy in advance to ask about cost.     5 days before your colonoscopy, contact your local pharmacy to  the prescribed Sutab medication. If your pharmacy does not have this prescription, contact your doctor office to send the prescription over.    1 day before your colonoscopy, make sure to follow the diet instructions.      At 6PM:  Step 1: Open the first bottle with 12 Sutab tablets and fill the container with 16 oz of water up to the fill line. Swallow each tablet with a sip of water. Drink the rest of the water over 15 to 20 minutes.  Step 2: About 1 hour after taking the last tablet, fill the  container again with 16 oz of water. Drink all of the water within 30 minutes.  Step 3: About 30 minutes after you finish drinking the second container of water, fill it again with 16 oz of water. Drink all of the water within 30 minutes.    6 hours before your scheduled exam time, open the second bottle of 12 Sutab tablets.  Repeat Step 1 to 3 above.  Nothing by mouth for at least 3 hours before your exam.           [facility]  [phone] [address], [place], [address]    [facility]  [phone] option 0 [address], [place], [address]   [facility]  [phone] [address] [facility], [address] [place] [address], [address]    [facility]  [phone] [address], [place], [place] [address]   [facility]  [phone] [address], [place], [address]   [facility]  [phone] [address] [place], [place], [address]   [facility]  [phone] [address], [place], [address]    [facility]  [phone]  [address], [place], [address]   [facility]  [phone] [address], [address]   [place], [place], [address]   [facility]  [phone] [address], [place], [address]   [facility]  [phone] [address], [place], [address]   [facility]  [phone] [address], [place] [address]   [facility]  [phone] [address] [facility], [place], [place], [address]   [facility] [phone] and option 0  [address], [place], [place], [address]   [facility] [phone] [address], [address]     [place], [place], [address]

## 2025-05-01 NOTE — PROGRESS NOTES
[facility]  [facility]  Clinic Note      Patient Information  Gia Dan                                                                 Subjective:     Chief Complaint   Patient presents with    New Patient Visit       HPI:    Gia Dan is an 35 y.o. female patient  who presents to gI clinic for evaluation of longstanding constipation and hematochezia.     Briefly, patient reports 15 year history of constipation ever since birth of her first son. Constipation described as having BM once every 15-28 days. When she does have a BM, she has to strain a lot and often accompanied with a lot of rectal bleeding. Reports accompanying rectal pain and has known hx of hemorrhoids. Reports trying metamucil and stool softeners but these have not helped much. Denies any associated abdominal pain, nausea or vomiting.       Past Medical History: Medical History[1]    Past Surgical History: Surgical History[2]    Past Family History: Family History[3]    Social History:   Tobacco Use History[4]  Social History     Substance and Sexual Activity   Alcohol Use Never     Social History     Substance and Sexual Activity   Drug Use Never       Allergies: Allergies[5]    MEDS:  Current Outpatient Medications   Medication Instructions    polyethylene glycol (GLYCOLAX, MIRALAX) 17 g, oral, 2 times daily    psyllium (Metamucil) powder 5.8 g, oral, Daily    sodium,potassium,mag sulfates (Suprep Bowel Prep Kit) 17.5-3.13-1.6 gram solution 2 bottles, oral, Daily    tiZANidine (ZANAFLEX) 2 mg, oral, Every 6 hours PRN        ROS:   General: no chills, no fevers  Cardiovascular: no chest pain, no palpitations  Others in 12 systems ROS were discussed and negative. Positive pertinent systems are listed in HPI.                                                                 Physical Exam:     /88   Pulse 87   Temp 36.5 °C (97.7 °F)   Wt 67.1 kg (148 lb)   SpO2 98%   BMI 23.18  kg/m²     Physical Exam   Alert and oriented  Normal respiratory efforts  Abdomen non-distended  Skin warm and dry  Ambulatory                                                                    Labs:     Lab Results   Component Value Date    WBC 8.0 06/18/2023    WBC 6.3 06/16/2023    WBC 7.0 06/10/2023    HGB 9.3 (L) 06/18/2023    HGB 10.0 (L) 06/16/2023    HGB 9.2 (L) 06/10/2023    MCV 83 06/18/2023    MCV 80 06/16/2023    MCV 79 (L) 06/10/2023     06/18/2023     06/16/2023     06/10/2023       Lab Results   Component Value Date    GLUCOSE 109 (H) 06/18/2023    CALCIUM 8.9 06/18/2023     06/18/2023    K 3.8 06/18/2023    CO2 23 06/18/2023     06/18/2023    BUN 8 06/18/2023    CREATININE 0.49 (L) 06/18/2023       Lab Results   Component Value Date    ALT 13 06/18/2023    ALT 14 06/08/2023    ALT 12 05/24/2023    AST 18 06/18/2023    AST 16 06/08/2023    AST 15 05/24/2023    ALKPHOS 84 06/18/2023    ALKPHOS 84 05/24/2023    ALKPHOS 44 02/05/2023    BILITOT 0.2 06/18/2023    BILITOT 0.3 05/24/2023    BILITOT 0.3 02/05/2023                                                                                  Imaging           === 08/30/21 ===    - Impression -  The liver was measured at 16 cm. No evidence of hepatomegaly. No  gallstones. No intra or extrahepatic biliary ductal dilatation. The  === 06/11/21 ===    CT CHEST PULMONARY EMBOLISM W IV CONTRAST    - Impression -  1.  Limited evaluation of the subsegmental branches of the pulmonary  artery. No acute pulmonary embolism.  2. Interval development of ground-glass opacities involving the right  lung and left lower lobe with associated peribronchovascular  thickening. Interval development of small right and trace left  pleural effusions. Correlate with concern for multifocal infectious  process versus edema.  3. No pneumothorax.    I personally reviewed the images/study and I agree with the findings  as stated. This study was interpreted  at [place], [place].                                                                     GI Procedures:                                                                    Assessment & Plan:     Assessment/Plan:     Gia Dan is an 35 y.o. female patient  who presents to gI clinic for evaluation of longstanding constipation and hematochezia.     #Constipation  :: Suspect she may slow transit constipation as well as some degree of dyssynergic defecation given difficulty evacuating stool while having BM. Will start with up titration of bowel regimen with Miralax TID, starting metamucil and monitoring symptoms    #Hematochezia  - Likely related to her hemorrhoids but given large volume rectal bleeding, she will need a colonoscopy to rule out any colonic malignancy although very unlikely.     Rec  - Colonoscopy  - Metamucil daily  - Miralax TID  - Continue fiber rich diet  - If refractory, consider anorectal manometry or barium defecography at next visit    RTC in 3 months      Detailed Counseling:     Assessed the patients understanding of their medications and discussed their treatment plan, assessed patient's response to medications and barriers to adherence    Geovanna Sauer MD   PGY5, Gastroenterology Fellow         [1]   Past Medical History:  Diagnosis Date    Encounter for initial prescription of contraceptive pills 2014    Encounter for initial prescription of contraceptive pills    Melena     Hematochezia    Other specified diseases of anus and rectum     Rectal pain    Papillomavirus as the cause of diseases classified elsewhere     Human papilloma virus infection    Personal history of other diseases of the circulatory system     History of hypertension    Personal history of other diseases of the nervous system and sense organs     History of migraine headaches    Personal history of other specified conditions     History of fatigue    Personal  history of other specified conditions     History of syncope   [2]   Past Surgical History:  Procedure Laterality Date    OTHER SURGICAL HISTORY  10/25/2013    Blood Transfusion (___ Ml)    OTHER SURGICAL HISTORY  11/01/2016    Laser Ablation Of Cervix    TONSILLECTOMY  09/06/2016    Tonsillectomy    TONSILLECTOMY  11/01/2016    Tonsillectomy    TUBAL LIGATION  10/25/2013    Tubal Ligation    TUBAL LIGATION  11/01/2016    Tubal Ligation   [3] No family history on file.  [4]   Social History  Tobacco Use   Smoking Status Never   Smokeless Tobacco Never   [5]   Allergies  Allergen Reactions    Bee Pollen Itching    House Dust Unknown    Ketorolac Tromethamine Unknown     anxiety    Levofloxacin Other and Unknown    Metoclopramide Unknown, Other and Hives     Causes anxiety    Sulfa (Sulfonamide Antibiotics) Hives    Sulfamethoxazole-Trimethoprim Swelling and Other    Topiramate Unknown

## 2025-05-22 ENCOUNTER — APPOINTMENT (OUTPATIENT)
Dept: PRIMARY CARE | Facility: CLINIC | Age: 35
End: 2025-05-22
Payer: COMMERCIAL

## 2025-05-22 VITALS
WEIGHT: 143 LBS | OXYGEN SATURATION: 96 % | HEIGHT: 67 IN | SYSTOLIC BLOOD PRESSURE: 125 MMHG | HEART RATE: 76 BPM | BODY MASS INDEX: 22.44 KG/M2 | DIASTOLIC BLOOD PRESSURE: 87 MMHG

## 2025-05-22 DIAGNOSIS — R52 PAIN: ICD-10-CM

## 2025-05-22 DIAGNOSIS — D56.1 BETA THALASSEMIA (MULTI): ICD-10-CM

## 2025-05-22 DIAGNOSIS — S16.1XXS STRAIN OF NECK MUSCLE, SEQUELA: ICD-10-CM

## 2025-05-22 DIAGNOSIS — K59.09 CHRONIC CONSTIPATION: Primary | ICD-10-CM

## 2025-05-22 DIAGNOSIS — G43.109 MIGRAINE WITH AURA AND WITHOUT STATUS MIGRAINOSUS, NOT INTRACTABLE: ICD-10-CM

## 2025-05-22 DIAGNOSIS — E55.9 VITAMIN D DEFICIENCY: ICD-10-CM

## 2025-05-22 PROBLEM — R63.4 WEIGHT LOSS: Status: ACTIVE | Noted: 2025-05-22

## 2025-05-22 PROBLEM — S16.1XXA STRAIN OF NECK MUSCLE: Status: ACTIVE | Noted: 2025-05-22

## 2025-05-22 PROBLEM — R53.1 WEAKNESS: Status: ACTIVE | Noted: 2025-05-22

## 2025-05-22 PROBLEM — R25.2 SPASM: Status: ACTIVE | Noted: 2025-05-22

## 2025-05-22 PROBLEM — M79.18 MYOFASCIAL PAIN DYSFUNCTION SYNDROME: Status: ACTIVE | Noted: 2025-05-22

## 2025-05-22 PROBLEM — O20.0 THREATENED ABORTION (HHS-HCC): Status: ACTIVE | Noted: 2025-05-22

## 2025-05-22 PROBLEM — M54.2 TRIGGER POINT OF NECK: Status: ACTIVE | Noted: 2025-05-22

## 2025-05-22 PROBLEM — G44.209 MUSCLE CONTRACTION HEADACHE: Status: ACTIVE | Noted: 2025-05-22

## 2025-05-22 PROBLEM — N89.8 VAGINAL DISCHARGE: Status: ACTIVE | Noted: 2025-05-22

## 2025-05-22 PROBLEM — R53.83 FATIGUE: Status: ACTIVE | Noted: 2025-05-22

## 2025-05-22 PROCEDURE — 3079F DIAST BP 80-89 MM HG: CPT | Performed by: STUDENT IN AN ORGANIZED HEALTH CARE EDUCATION/TRAINING PROGRAM

## 2025-05-22 PROCEDURE — 3074F SYST BP LT 130 MM HG: CPT | Performed by: STUDENT IN AN ORGANIZED HEALTH CARE EDUCATION/TRAINING PROGRAM

## 2025-05-22 PROCEDURE — 99214 OFFICE O/P EST MOD 30 MIN: CPT | Performed by: STUDENT IN AN ORGANIZED HEALTH CARE EDUCATION/TRAINING PROGRAM

## 2025-05-22 PROCEDURE — 1036F TOBACCO NON-USER: CPT | Performed by: STUDENT IN AN ORGANIZED HEALTH CARE EDUCATION/TRAINING PROGRAM

## 2025-05-22 PROCEDURE — 3008F BODY MASS INDEX DOCD: CPT | Performed by: STUDENT IN AN ORGANIZED HEALTH CARE EDUCATION/TRAINING PROGRAM

## 2025-05-22 RX ORDER — IBUPROFEN 800 MG/1
800 TABLET, FILM COATED ORAL 3 TIMES DAILY PRN
Qty: 180 TABLET | Refills: 1 | Status: SHIPPED | OUTPATIENT
Start: 2025-05-22 | End: 2026-05-22

## 2025-05-22 RX ORDER — POLYETHYLENE GLYCOL-3350 AND ELECTROLYTES 236; 6.74; 5.86; 2.97; 22.74 G/274.31G; G/274.31G; G/274.31G; G/274.31G; G/274.31G
POWDER, FOR SOLUTION ORAL
COMMUNITY
Start: 2025-05-01

## 2025-05-22 ASSESSMENT — COLUMBIA-SUICIDE SEVERITY RATING SCALE - C-SSRS
6. HAVE YOU EVER DONE ANYTHING, STARTED TO DO ANYTHING, OR PREPARED TO DO ANYTHING TO END YOUR LIFE?: NO
1. IN THE PAST MONTH, HAVE YOU WISHED YOU WERE DEAD OR WISHED YOU COULD GO TO SLEEP AND NOT WAKE UP?: NO
2. HAVE YOU ACTUALLY HAD ANY THOUGHTS OF KILLING YOURSELF?: NO

## 2025-05-22 ASSESSMENT — PATIENT HEALTH QUESTIONNAIRE - PHQ9
1. LITTLE INTEREST OR PLEASURE IN DOING THINGS: NOT AT ALL
2. FEELING DOWN, DEPRESSED OR HOPELESS: NOT AT ALL
SUM OF ALL RESPONSES TO PHQ9 QUESTIONS 1 AND 2: 0

## 2025-05-22 ASSESSMENT — ENCOUNTER SYMPTOMS
OCCASIONAL FEELINGS OF UNSTEADINESS: 0
LOSS OF SENSATION IN FEET: 0
DEPRESSION: 0

## 2025-05-22 NOTE — PROGRESS NOTES
35-year-old female presenting for follow-up of multiple concerns.    Chronic constipation  Has tried Metamucil and MiraLAX, some benefit, but still symptomatic.  Following with GI, has upcoming colonoscopy.    Migraines  Had benefit with Ubrelvy, but does report that she felt very activated, and had trouble sleeping.  Gets about 14 migraines a month.    Neck strain  Chronic, flares frequently.  Did not do well with Flexeril or tizanidine.    Beta thalassemia  Stable    12 point ROS reviewed and negative other than as stated in HPI      General: Alert, oriented, pleasant, in no acute distress  HEENT:      Head: normocephalic, atraumatic;      eyes: EOMI, no scleral icterus;   Neck: soft, supple, non-tender, no masses appreciated  CV: Heart with regular rate and rhythm, normal S1/S2, no murmurs  Lungs: CTAB without wheezing, rhonchi or rales; good respiratory effort, no increased work of breathing  Abdomen: soft, non-tender, non-distended, no masses appreciated  Neuro: Cranial nerves grossly intact; alert and oriented, normal gait  Psych: Appropriate mood and affect    #Chronic constipation  - Some improvement with supplemental fiber and MiraLAX, but symptoms still persistent   -Pharmacy consultation to trial Linzess or Trulance    #Migraines  -Given samples of Ubrelvy and Nurtec to trial, had good benefit from Ubrelvy, no response from Nurtec, did not feel activated, and had some trouble sleeping  - Given response to CGRP and prevalence of migraines, 15 monthly, will trial Qulipta, pharmacist to work on pricing    #Neck pain  -Massage therapy    #Beta thalassemia   -Stable, repeat CBC    F/U 3-4 months, sooner if indicated    Chris D'Amico, DO

## 2025-05-23 DIAGNOSIS — E61.1 IRON DEFICIENCY: Primary | ICD-10-CM

## 2025-05-23 LAB
25(OH)D3+25(OH)D2 SERPL-MCNC: 14 NG/ML (ref 30–100)
ALBUMIN SERPL-MCNC: 4.9 G/DL (ref 3.6–5.1)
ALP SERPL-CCNC: 44 U/L (ref 31–125)
ALT SERPL-CCNC: 11 U/L (ref 6–29)
ANION GAP SERPL CALCULATED.4IONS-SCNC: 10 MMOL/L (CALC) (ref 7–17)
AST SERPL-CCNC: 16 U/L (ref 10–30)
BILIRUB SERPL-MCNC: 0.5 MG/DL (ref 0.2–1.2)
BUN SERPL-MCNC: 14 MG/DL (ref 7–25)
CALCIUM SERPL-MCNC: 9.6 MG/DL (ref 8.6–10.2)
CHLORIDE SERPL-SCNC: 105 MMOL/L (ref 98–110)
CHOLEST SERPL-MCNC: 165 MG/DL
CHOLEST/HDLC SERPL: 2.8 (CALC)
CO2 SERPL-SCNC: 23 MMOL/L (ref 20–32)
CREAT SERPL-MCNC: 0.84 MG/DL (ref 0.5–0.97)
EGFRCR SERPLBLD CKD-EPI 2021: 92 ML/MIN/1.73M2
ERYTHROCYTE [DISTWIDTH] IN BLOOD BY AUTOMATED COUNT: 13.4 % (ref 11–15)
FERRITIN SERPL-MCNC: 5 NG/ML (ref 16–154)
GLUCOSE SERPL-MCNC: 75 MG/DL (ref 65–99)
HCT VFR BLD AUTO: 35.7 % (ref 35–45)
HDLC SERPL-MCNC: 60 MG/DL
HGB BLD-MCNC: 10.9 G/DL (ref 11.7–15.5)
IRON SATN MFR SERPL: 33 % (CALC) (ref 16–45)
IRON SERPL-MCNC: 126 MCG/DL (ref 40–190)
LDLC SERPL CALC-MCNC: 91 MG/DL (CALC)
MCH RBC QN AUTO: 24.5 PG (ref 27–33)
MCHC RBC AUTO-ENTMCNC: 30.5 G/DL (ref 32–36)
MCV RBC AUTO: 80.4 FL (ref 80–100)
NONHDLC SERPL-MCNC: 105 MG/DL (CALC)
PLATELET # BLD AUTO: 387 THOUSAND/UL (ref 140–400)
PMV BLD REES-ECKER: 10.4 FL (ref 7.5–12.5)
POTASSIUM SERPL-SCNC: 4.3 MMOL/L (ref 3.5–5.3)
PROT SERPL-MCNC: 7.8 G/DL (ref 6.1–8.1)
RBC # BLD AUTO: 4.44 MILLION/UL (ref 3.8–5.1)
SODIUM SERPL-SCNC: 138 MMOL/L (ref 135–146)
TIBC SERPL-MCNC: 387 MCG/DL (CALC) (ref 250–450)
TRIGL SERPL-MCNC: 49 MG/DL
TSH SERPL-ACNC: 0.84 MIU/L
WBC # BLD AUTO: 3.3 THOUSAND/UL (ref 3.8–10.8)

## 2025-05-23 RX ORDER — FERROUS SULFATE 325(65) MG
325 TABLET, DELAYED RELEASE (ENTERIC COATED) ORAL EVERY OTHER DAY
Qty: 45 TABLET | Refills: 1 | Status: SHIPPED | OUTPATIENT
Start: 2025-05-23 | End: 2025-11-19

## 2025-05-23 NOTE — RESULT ENCOUNTER NOTE
White blood cell count slightly low at 3.3    Borderline to mild anemia with hemoglobin at 10.9. Ferritin very low at 5, this is consistent with iron deficiency anemia.  So I would consider taking iron supplementation every other day.  I will send to her pharmacy.    Vitamin D moderately low at 14, recommend OTC vitamin D3, 5000 units daily.    Remaining labs essentially unremarkable.

## 2025-06-17 ENCOUNTER — APPOINTMENT (OUTPATIENT)
Dept: PHARMACY | Facility: HOSPITAL | Age: 35
End: 2025-06-17
Payer: COMMERCIAL

## 2025-06-26 ENCOUNTER — APPOINTMENT (OUTPATIENT)
Dept: OBSTETRICS AND GYNECOLOGY | Facility: CLINIC | Age: 35
End: 2025-06-26
Payer: COMMERCIAL

## 2025-06-26 VITALS
SYSTOLIC BLOOD PRESSURE: 107 MMHG | DIASTOLIC BLOOD PRESSURE: 73 MMHG | BODY MASS INDEX: 22.82 KG/M2 | HEIGHT: 66 IN | WEIGHT: 142 LBS

## 2025-06-26 DIAGNOSIS — F32.81 PMDD (PREMENSTRUAL DYSPHORIC DISORDER): Primary | ICD-10-CM

## 2025-06-26 DIAGNOSIS — Z00.00 HEALTHCARE MAINTENANCE: ICD-10-CM

## 2025-06-26 PROCEDURE — 3074F SYST BP LT 130 MM HG: CPT | Performed by: OBSTETRICS & GYNECOLOGY

## 2025-06-26 PROCEDURE — 3078F DIAST BP <80 MM HG: CPT | Performed by: OBSTETRICS & GYNECOLOGY

## 2025-06-26 PROCEDURE — 99213 OFFICE O/P EST LOW 20 MIN: CPT | Performed by: OBSTETRICS & GYNECOLOGY

## 2025-06-26 PROCEDURE — 3008F BODY MASS INDEX DOCD: CPT | Performed by: OBSTETRICS & GYNECOLOGY

## 2025-06-26 RX ORDER — SERTRALINE HYDROCHLORIDE 50 MG/1
50 TABLET, FILM COATED ORAL DAILY
Qty: 30 TABLET | Refills: 11 | Status: SHIPPED | OUTPATIENT
Start: 2025-06-26 | End: 2026-06-26

## 2025-06-26 RX ORDER — FERROUS SULFATE 325(65) MG
TABLET ORAL
COMMUNITY
Start: 2025-05-23

## 2025-06-26 ASSESSMENT — ENCOUNTER SYMPTOMS
NEUROLOGICAL NEGATIVE: 0
CARDIOVASCULAR NEGATIVE: 0
SLEEP DISTURBANCE: 1
ENDOCRINE NEGATIVE: 0
MUSCULOSKELETAL NEGATIVE: 0
ALLERGIC/IMMUNOLOGIC NEGATIVE: 0
CONSTITUTIONAL NEGATIVE: 0
DYSPHORIC MOOD: 1
PSYCHIATRIC NEGATIVE: 0
RESPIRATORY NEGATIVE: 0
GASTROINTESTINAL NEGATIVE: 0
EYES NEGATIVE: 0
HEMATOLOGIC/LYMPHATIC NEGATIVE: 0

## 2025-06-26 ASSESSMENT — PAIN SCALES - GENERAL: PAINLEVEL_OUTOF10: 0-NO PAIN

## 2025-06-26 NOTE — PROGRESS NOTES
Subjective   Patient ID: Gia Dan is a 35 y.o. female who presents for PMDD (Patient here for PMDD, no pain or falls, no other complaints or concerns, last pap 22 wnl HPV negative no chaperone needed).  HPI  Patient is a 35-year-old female  5 para 3 known to the practice here for evaluation of PMDD.  Her  has a vasectomy for contraception and very happy with this method.  She has not been on any contraception for years previously with did have an IUD but had significant breakthrough bleeding.    She has significant mood disorders dysphoria difficulty engaging with other human beings from ovulation until the commencement of her cycle.  Affecting daily living.  Hesitated on traveling due to known timing of her dysphoria.  Review of Systems   Psychiatric/Behavioral:  Positive for dysphoric mood and sleep disturbance.        Objective   Physical Exam  Alert oriented and engauged      Assessment/Plan   PMDD affecting daily living    Discussed options of cycle control, and SSRIs.  Previous attempts at cycle control have had significant breakthrough bleeding and she does not currently need birth control as her partner has a vasectomy.    She does have a behavioral counselor which she engages with regularly    Would like to begin on Zoloft 50 mg daily  Reengaged by telemedicine visit in 6 weeks to reevaluate  May consider increasing dose         Randell Hills MD 25 10:01 AM

## 2025-06-27 ENCOUNTER — APPOINTMENT (OUTPATIENT)
Dept: GASTROENTEROLOGY | Facility: HOSPITAL | Age: 35
End: 2025-06-27
Payer: COMMERCIAL

## 2025-06-27 ENCOUNTER — ANESTHESIA EVENT (OUTPATIENT)
Dept: GASTROENTEROLOGY | Facility: HOSPITAL | Age: 35
End: 2025-06-27

## 2025-06-27 ENCOUNTER — ANESTHESIA (OUTPATIENT)
Dept: GASTROENTEROLOGY | Facility: HOSPITAL | Age: 35
End: 2025-06-27
Payer: COMMERCIAL

## 2025-07-03 ENCOUNTER — APPOINTMENT (OUTPATIENT)
Dept: OBSTETRICS AND GYNECOLOGY | Facility: CLINIC | Age: 35
End: 2025-07-03
Payer: COMMERCIAL

## 2025-07-03 DIAGNOSIS — Z12.11 COLON CANCER SCREENING: Primary | ICD-10-CM

## 2025-07-03 RX ORDER — POLYETHYLENE GLYCOL 3350 17 G/17G
POWDER, FOR SOLUTION ORAL
Qty: 578 G | Refills: 0 | Status: SHIPPED | OUTPATIENT
Start: 2025-07-03

## 2025-07-25 ENCOUNTER — APPOINTMENT (OUTPATIENT)
Dept: PHARMACY | Facility: HOSPITAL | Age: 35
End: 2025-07-25
Payer: COMMERCIAL

## 2025-07-25 DIAGNOSIS — K59.09 CHRONIC CONSTIPATION: Primary | ICD-10-CM

## 2025-08-07 ENCOUNTER — APPOINTMENT (OUTPATIENT)
Dept: OBSTETRICS AND GYNECOLOGY | Facility: CLINIC | Age: 35
End: 2025-08-07
Payer: COMMERCIAL

## 2025-08-07 DIAGNOSIS — F41.9 ANXIETY: Primary | ICD-10-CM

## 2025-08-07 DIAGNOSIS — N92.6 IRREGULAR MENSES: ICD-10-CM

## 2025-08-07 RX ORDER — DROSPIRENONE 4 MG/1
4 TABLET, FILM COATED ORAL DAILY
Qty: 30 TABLET | Refills: 11 | Status: SHIPPED | OUTPATIENT
Start: 2025-08-07 | End: 2025-09-06

## 2025-08-07 NOTE — PROGRESS NOTES
"Subjective   Patient ID: Gia Dan is a 35 y.o. female who presents for No chief complaint on file..  HPI  Patient for virtual visit to review effects of Zoloft on anxiety and mild depression.  She did not tolerate Zoloft well found that it made her sleepy and lethargic and significantly reduced her libido.  \"Her vagina went numb\"    Still with slightly irregular menses and feels as though her anxiety gets worse as her period approaches.  Review of Systems    Objective   Physical Exam  Deferred due to telemedicine  Assessment/Plan   Suggested possibly using Wellbutrin but this could accentuate her anxiety.    Will use Slynd for cycle control and see if this affects her anxiety as she approaches her menses    She is in therapy and may consider anxiety lytic recommendations from her therapist         Randell Hills MD 08/07/25 9:55 AM   "

## 2025-08-08 ENCOUNTER — APPOINTMENT (OUTPATIENT)
Dept: GASTROENTEROLOGY | Facility: HOSPITAL | Age: 35
End: 2025-08-08
Payer: COMMERCIAL

## 2025-08-25 ENCOUNTER — APPOINTMENT (OUTPATIENT)
Dept: PHARMACY | Facility: HOSPITAL | Age: 35
End: 2025-08-25
Payer: COMMERCIAL

## 2025-09-18 ENCOUNTER — APPOINTMENT (OUTPATIENT)
Dept: PRIMARY CARE | Facility: CLINIC | Age: 35
End: 2025-09-18
Payer: COMMERCIAL

## 2025-10-02 ENCOUNTER — APPOINTMENT (OUTPATIENT)
Dept: OBSTETRICS AND GYNECOLOGY | Facility: CLINIC | Age: 35
End: 2025-10-02
Payer: COMMERCIAL